# Patient Record
Sex: MALE | Race: WHITE
[De-identification: names, ages, dates, MRNs, and addresses within clinical notes are randomized per-mention and may not be internally consistent; named-entity substitution may affect disease eponyms.]

---

## 2018-07-18 ENCOUNTER — HOSPITAL ENCOUNTER (EMERGENCY)
Dept: HOSPITAL 14 - H.ER | Age: 38
LOS: 1 days | Discharge: HOME | End: 2018-07-19
Payer: SELF-PAY

## 2018-07-18 DIAGNOSIS — R09.89: Primary | ICD-10-CM

## 2018-07-18 NOTE — ED PDOC
HPI: General Adult


Time Seen by Provider: 07/18/18 23:32


Chief Complaint (Nursing): Foreign Body


Chief Complaint (Provider): possible fish bone stuck in throat


History Per: Patient


History/Exam Limitations: no limitations


Additional Complaint(s): 





38 y/o male presents for evaluation of possible fish bone stuck in throat x 2 

days.  Patient states he was eating fish 2 days ago and feels a piece stuck and 

has not moved despite at home remedies.  Reports pain when swallowing. Denies 

fever, nausea/vomiting, chest pain, shortness of breath, palpitations.  





Past Medical History


Reviewed: Historical Data, Nursing Documentation, Vital Signs


Vital Signs: 


 Last Vital Signs











Temp  97.7 F   07/18/18 23:01


 


Pulse  69   07/18/18 23:01


 


Resp  16   07/18/18 23:01


 


BP  115/86   07/18/18 23:01


 


Pulse Ox  98   07/18/18 23:58














- Medical History


PMH: No Chronic Diseases





- Surgical History


Surgical History: No Surg Hx





- Family History


Family History: States: No Known Family Hx





- Living Arrangements


Living Arrangements: With Family





- Home Medications


Home Medications: 


 Ambulatory Orders











 Medication  Instructions  Recorded


 


Ibuprofen [Motrin Tab] 1 tab PO Q6 PRN #20 tab 07/19/18














- Allergies


Allergies/Adverse Reactions: 


 Allergies











Allergy/AdvReac Type Severity Reaction Status Date / Time


 


No Known Allergies Allergy   Verified 07/18/18 23:01














Review of Systems


ROS Statement: Except As Marked, All Systems Reviewed And Found Negative


ENT: Positive for: Throat Pain





Physical Exam





- Reviewed


Nursing Documentation Reviewed: Yes


Vital Signs Reviewed: Yes





- Physical Exam


Appears: Positive for: Well, Non-toxic, No Acute Distress


Head Exam: Positive for: ATRAUMATIC, NORMAL INSPECTION, NORMOCEPHALIC


Skin: Positive for: Normal Color


Eye Exam: Positive for: Normal appearance


ENT: Positive for: Normal ENT Inspection


Cardiovascular/Chest: Positive for: Regular Rate, Rhythm


Respiratory: Positive for: Normal Breath Sounds


Gastrointestinal/Abdominal: Positive for: Normal Exam


Back: Positive for: Normal Inspection


Extremity: Positive for: Normal ROM


Neurologic/Psych: Positive for: Alert, Oriented (x3)





- ECG


O2 Sat by Pulse Oximetry: 98





- Progress


ED Course And Treament: 


CT soft tissue neck





EXAM:


CT Neck Without Intravenous Contrast


EXAM DATE/TIME:


Exam ordered 7/18/2018 11:44 PM


CLINICAL HISTORY:


37 years old, male; Pain; Throat pain; Additional info: R/O fb, possible fish 

bone in throat


TECHNIQUE:


Axial computed tomography images of the neck without intravenous contrast. All 

CT scans at this


facility use at least one of these dose optimization techniques: automated 

exposure control; mA


and/or kV adjustment per patient size (includes targeted exams where dose is 

matched to clinical


indication); or iterative reconstruction.


COMPARISON:


No relevant prior studies available.


FINDINGS:


Oropharynx: Normal. No significant tonsillar enlargement.


Hypopharynx: Normal.


Larynx: Normal. Normal epiglottis.


Trachea: Normal.


Retropharyngeal space: Normal.


Submandibular/parotid glands: Normal. Glands are normal in size.


Thyroid: Normal. No enlarged or calcified nodules.


Bones/joints: No acute fracture.


Soft tissues: Normal.


Vasculature: No acute findings.


Lymph nodes: Normal. No lymphadenopathy.


Lung apices: Unremarkable as visualized.


IMPRESSION:


No CT evidence for ingested foreign body.





Patient educated on findings, discharged with rx ibuprofen


Advised follow up PMD 2-3 days


Return precautions given








Disposition





- Clinical Impression


Clinical Impression: 


 Foreign body sensation in throat








- Patient ED Disposition


Is Patient to be Admitted: No


Counseled Patient/Family Regarding: Studies Performed, Diagnosis, Need For 

Followup, Rx Given





- Disposition


Referrals: 


[facility] [Outside]


Disposition: Routine/Home


Disposition Time: 00:51


Condition: STABLE


Prescriptions: 


Ibuprofen [Motrin Tab] 1 tab PO Q6 PRN #20 tab


 PRN Reason: Pain, Moderate (4-7)

## 2018-07-19 VITALS
OXYGEN SATURATION: 100 % | HEART RATE: 74 BPM | RESPIRATION RATE: 17 BRPM | SYSTOLIC BLOOD PRESSURE: 111 MMHG | DIASTOLIC BLOOD PRESSURE: 63 MMHG | TEMPERATURE: 97.9 F

## 2018-07-19 NOTE — CT
Date of service: 



07/18/2018



PROCEDURE:  CT NECK WITHOUT  CONTRAST



HISTORY:

r/o FB,  possible fish bone in throat



COMPARISON:

None.



TECHNIQUE:

CT of the neck without intravenous contrast. Coronal and sagittal 

reformats generated. 



Radiation dose: .54 mGy-cm



This CT exam was performed using one or more of the following dose 

reduction techniques: Automated exposure control, adjustment of the 

mA and/or kV according to patient size, and/or use of iterative 

reconstruction technique.



FINDINGS:



NASOPHARYNX:

Unremarkable.



SUPRAHYOID NECK:

Unremarkable oropharynx, oral cavity, parapharyngeal space and 

retropharyngeal space. No retained radiodense foreign body is 

appreciated grossly.



INFRAHYOID NECK:

Unremarkable larynx, hypopharynx, and supraglottic space. Vocal cords 

intact. No retained radiodense foreign body is appreciated.



MASS:

No findings suggest definite



GLANDS:

Parotid and submandibular glands unremarkable. Normal size thyroid 

gland, without nodule.



LYMPH NODES:

Normal. No lymphadenopathy. Mass in this noncontrast neck CT. 



CERVICAL SPINE:

No fracture or focal lesion. 



OTHER FINDINGS:

None.



IMPRESSION:

Unremarkable non-contrast enhanced CT of the neck. No retained 

radiodense foreign body appreciated in the pharynx, larynx or in the 

visualized esophagus down to the level of the mid chest.



Concordant preliminary report from [facility], 07/19/2018.

## 2018-11-18 ENCOUNTER — HOSPITAL ENCOUNTER (EMERGENCY)
Dept: HOSPITAL 14 - H.ER | Age: 38
LOS: 1 days | Discharge: HOME | End: 2018-11-19
Payer: SELF-PAY

## 2018-11-18 DIAGNOSIS — R07.89: Primary | ICD-10-CM

## 2018-11-18 DIAGNOSIS — K92.1: ICD-10-CM

## 2018-11-18 LAB
ALBUMIN SERPL-MCNC: 5.1 G/DL (ref 3.5–5)
ALBUMIN/GLOB SERPL: 1.2 {RATIO} (ref 1–2.1)
ALT SERPL-CCNC: 128 U/L (ref 21–72)
AST SERPL-CCNC: 69 U/L (ref 17–59)
BASOPHILS # BLD AUTO: 0 K/UL (ref 0–0.2)
BASOPHILS NFR BLD: 0.3 % (ref 0–2)
BILIRUB UR-MCNC: NEGATIVE MG/DL
BUN SERPL-MCNC: 20 MG/DL (ref 9–20)
CALCIUM SERPL-MCNC: 10 MG/DL (ref 8.4–10.2)
COLOR UR: YELLOW
EOSINOPHIL # BLD AUTO: 0.4 K/UL (ref 0–0.7)
EOSINOPHIL NFR BLD: 4.3 % (ref 0–4)
ERYTHROCYTE [DISTWIDTH] IN BLOOD BY AUTOMATED COUNT: 13.6 % (ref 11.5–14.5)
GFR NON-AFRICAN AMERICAN: > 60
GLUCOSE UR STRIP-MCNC: (no result) MG/DL
HGB BLD-MCNC: 16.5 G/DL (ref 12–18)
LEUKOCYTE ESTERASE UR-ACNC: (no result) LEU/UL
LIPASE SERPL-CCNC: 216 U/L (ref 23–300)
LYMPHOCYTES # BLD AUTO: 2.7 K/UL (ref 1–4.3)
LYMPHOCYTES NFR BLD AUTO: 27.4 % (ref 20–40)
MCH RBC QN AUTO: 29.8 PG (ref 27–31)
MCHC RBC AUTO-ENTMCNC: 33.6 G/DL (ref 33–37)
MCV RBC AUTO: 88.7 FL (ref 80–94)
MONOCYTES # BLD: 0.9 K/UL (ref 0–0.8)
MONOCYTES NFR BLD: 9.4 % (ref 0–10)
NEUTROPHILS # BLD: 5.7 K/UL (ref 1.8–7)
NEUTROPHILS NFR BLD AUTO: 58.6 % (ref 50–75)
NRBC BLD AUTO-RTO: 0.1 % (ref 0–0)
PH UR STRIP: 6 [PH] (ref 5–8)
PLATELET # BLD: 300 K/UL (ref 130–400)
PMV BLD AUTO: 8.2 FL (ref 7.2–11.7)
PROT UR STRIP-MCNC: NEGATIVE MG/DL
RBC # BLD AUTO: 5.53 MIL/UL (ref 4.4–5.9)
RBC # UR STRIP: NEGATIVE /UL
SP GR UR STRIP: 1.02 (ref 1–1.03)
URINE CLARITY: (no result)
UROBILINOGEN UR-MCNC: (no result) MG/DL (ref 0.2–1)
WBC # BLD AUTO: 9.8 K/UL (ref 4.8–10.8)

## 2018-11-18 PROCEDURE — 83690 ASSAY OF LIPASE: CPT

## 2018-11-18 PROCEDURE — 99283 EMERGENCY DEPT VISIT LOW MDM: CPT

## 2018-11-18 PROCEDURE — 74022 RADEX COMPL AQT ABD SERIES: CPT

## 2018-11-18 PROCEDURE — 85025 COMPLETE CBC W/AUTO DIFF WBC: CPT

## 2018-11-18 PROCEDURE — 81003 URINALYSIS AUTO W/O SCOPE: CPT

## 2018-11-18 PROCEDURE — 84484 ASSAY OF TROPONIN QUANT: CPT

## 2018-11-18 PROCEDURE — 96360 HYDRATION IV INFUSION INIT: CPT

## 2018-11-18 PROCEDURE — 80053 COMPREHEN METABOLIC PANEL: CPT

## 2018-11-18 NOTE — ED PDOC
HPI: Chest Pain


Time Seen by Provider: 18 22:18


Chief Complaint (Nursing): Chest Pain


Chief Complaint (Provider): Chest pain


History Per: Patient


History/Exam Limitations: no limitations


Onset/Duration Of Symptoms: Days (chest pain: 2x weeks. Rectal bleedinx day)


Current Symptoms Are (Timing): Still Present


Severity: Moderate


Associated Symptoms: Other (rectal bleeding (2x episodes with bowel movements))


Additional Complaint(s): 





38 year old Mac[place] male with a past medical history of peptic ulcer disease 

presents to the ED with complaints of left sided chest pain that has been 

ongoing for 2x weeks accompanied by rectal bleeding that started today. Patient 

states he had 2x bowel movements today, the first one he saw a small amount of 

blood, subsequently his second bowel movement, had large amounts of bright red 

blood. Patient denies having abdominal pain, but reports having left sided chest

pain which he attributed to his peptic ulcer disease. Patient states that the 

first 2x days he had chest pain, he took Nexium. Patient denies having nausea, 

vomiting, changes in appetite, cough, fevers, shortness of breath. 





PMD: None provided.





Past Medical History


Reviewed: Historical Data, Nursing Documentation, Vital Signs


Vital Signs: 





                                Last Vital Signs











Temp  97.9 F   18 22:00


 


Pulse  89   18 22:00


 


Resp  16   18 22:00


 


BP  122/77   18 22:00


 


Pulse Ox  99   18 22:00














- Medical History


PMH: Gastritis


   Denies: Chronic Kidney Disease


Other PMH: peptic ulcer disease





- Family History


Family History: States: No Known Family Hx





- Social History


Current smoker - smoking cessation education provided: No


Alcohol: None


Drugs: Denies





- Home Medications


Home Medications: 


                                Ambulatory Orders











 Medication  Instructions  Recorded


 


Ibuprofen [Motrin Tab] 1 tab PO Q6 PRN #20 tab 18


 


Esomeprazole Magnesium [Nexium] 20 mg PO QAM #14 ecc 18














- Allergies


Allergies/Adverse Reactions: 


                                    Allergies











Allergy/AdvReac Type Severity Reaction Status Date / Time


 


No Known Allergies Allergy   Verified 18 23:01














Review of Systems


ROS Statement: Except As Marked, All Systems Reviewed And Found Negative


Constitutional: Negative for: Fever, Other (loss of appetite)


Cardiovascular: Positive for: Chest Pain


Respiratory: Negative for: Cough, Shortness of Breath


Gastrointestinal: Positive for: Other (rectal bleeding (2x episodes) with bowel 

movements).  Negative for: Nausea, Vomiting, Abdominal Pain





Physical Exam





- Reviewed


Nursing Documentation Reviewed: Yes


Vital Signs Reviewed: Yes





- Physical Exam


Appears: Positive for: Well, Non-toxic, No Acute Distress


Head Exam: Positive for: ATRAUMATIC, NORMOCEPHALIC


Skin: Positive for: Normal Color


Cardiovascular/Chest: Positive for: Regular Rate, Rhythm


Respiratory: Positive for: Normal Breath Sounds


Gastrointestinal/Abdominal: Positive for: Tenderness (mild left upper quadrant 

tenderness. )


Back: Positive for: Normal Inspection


Extremity: Positive for: Normal ROM


Neurologic/Psych: Positive for: Alert, Oriented (3x)





- Laboratory Results


Result Diagrams: 


                                 18 22:51





                                 18 22:51





- ECG


O2 Sat by Pulse Oximetry: 99 (RA)


Pulse Ox Interpretation: Normal





Medical Decision Making


Medical Decision Makin:18


Initial impression: 38 year old male with chest pain.


Initial plan:


* XRay abdomen with chest


* EKG


* CMP


* drug screen


* lipase


* troponin I


* udip


* CBC


* urinalysis


* occult blood stool


* IV NS 1,000 ml IV ,1000 mls/hr


* protonix injection 40 mg IV


* reevaluation








2335 


Labs reviewed for no clinically significant abnormalities. XR showed no active 

diseases. Patient reports improvement of chest pain and is stable for discharge 

with diagnosis of atypical chest pain and hematochezia. Referral to GI 

specialist will be given to patient.








Scribe Attestation:


Documented byAracelis Gates, acting as a scribe for Bryce Montilla MD.





Provider Scribe Attestation:


All medical record entries made by the Scribe were at my direction and 

personally dictated by me. I have reviewed the chart and agree that the record 

accurately reflects my personal performance of the history, physical exam, 

medical decision making, and the department course for this patient. I have also

 personally directed, reviewed, and agree with the discharge instructions and 

disposition.





Disposition





- Clinical Impression


Clinical Impression: 


 Atypical chest pain, Hematochezia








- Patient ED Disposition


Is Patient to be Admitted: No


Counseled Patient/Family Regarding: Studies Performed, Diagnosis, Need For 

Followup





- Disposition


Referrals: 


[facility] [Outside]


Chuy Harrison MD [Staff Provider] - 


Disposition: Routine/Home


Disposition Time: 23:37


Condition: STABLE


Prescriptions: 


Esomeprazole Magnesium [Nexium] 20 mg PO QAM #14 ecc


Instructions:  Chest Pain That Is Not Caused by the Heart (DC), Bloody Stools


Forms:  [employer] (English)

## 2018-11-19 VITALS
TEMPERATURE: 97.1 F | OXYGEN SATURATION: 100 % | HEART RATE: 81 BPM | DIASTOLIC BLOOD PRESSURE: 68 MMHG | RESPIRATION RATE: 18 BRPM | SYSTOLIC BLOOD PRESSURE: 106 MMHG

## 2018-11-19 NOTE — RAD
Date of service: 



11/18/2018



PROCEDURE:  Radiographs of the chest and abdomen (obstructive series)



HISTORY:

 abd pain 



COMPARISON:

No prior.



TECHNIQUE:

AP radiograph of the chest, with upright and supine radiographs of 

the abdomen.



FINDINGS:



CHEST:

Lungs: Clear.



Cardiovascular: Normal size heart. No pulmonary vascular congestion. 



 No aortic atherosclerotic calcification present



Pleura: No pleural fluid. No pneumothorax.



Other findings: None.



ABDOMEN AND PELVIS:

Bowel: Prominent retained fecal material seen throughout the colon.  

No evidence of mechanical obstruction.



Free air: None.



Bones:  Unremarkable.



Other findings: None.



IMPRESSION:

Unremarkable radiographs of chest.  Potential constipation pattern in 

abdomen radiographs.  No evidence of mechanical bowel obstruction.

## 2019-03-16 ENCOUNTER — HOSPITAL ENCOUNTER (EMERGENCY)
Dept: HOSPITAL 14 - H.ER | Age: 39
Discharge: HOME | End: 2019-03-16
Payer: SELF-PAY

## 2019-03-16 VITALS
RESPIRATION RATE: 16 BRPM | TEMPERATURE: 98.1 F | HEART RATE: 70 BPM | DIASTOLIC BLOOD PRESSURE: 85 MMHG | SYSTOLIC BLOOD PRESSURE: 146 MMHG

## 2019-03-16 VITALS — OXYGEN SATURATION: 95 %

## 2019-03-16 DIAGNOSIS — F43.9: ICD-10-CM

## 2019-03-16 DIAGNOSIS — R07.9: Primary | ICD-10-CM

## 2019-03-16 LAB
ALBUMIN SERPL-MCNC: 4.2 G/DL (ref 3.5–5)
ALBUMIN/GLOB SERPL: 1 {RATIO} (ref 1–2.1)
ALT SERPL-CCNC: 111 U/L (ref 21–72)
AST SERPL-CCNC: 55 U/L (ref 17–59)
BASOPHILS # BLD AUTO: 0 K/UL (ref 0–0.2)
BASOPHILS NFR BLD: 0.6 % (ref 0–2)
BILIRUB UR-MCNC: NEGATIVE MG/DL
BUN SERPL-MCNC: 16 MG/DL (ref 9–20)
CALCIUM SERPL-MCNC: 9.7 MG/DL (ref 8.4–10.2)
COLOR UR: YELLOW
EOSINOPHIL # BLD AUTO: 0.3 K/UL (ref 0–0.7)
EOSINOPHIL NFR BLD: 5 % (ref 0–4)
ERYTHROCYTE [DISTWIDTH] IN BLOOD BY AUTOMATED COUNT: 13.7 % (ref 11.5–14.5)
GFR NON-AFRICAN AMERICAN: > 60
GLUCOSE UR STRIP-MCNC: (no result) MG/DL
HGB BLD-MCNC: 15.4 G/DL (ref 12–18)
LEUKOCYTE ESTERASE UR-ACNC: (no result) LEU/UL
LYMPHOCYTES # BLD AUTO: 2.5 K/UL (ref 1–4.3)
LYMPHOCYTES NFR BLD AUTO: 36.6 % (ref 20–40)
MCH RBC QN AUTO: 29.5 PG (ref 27–31)
MCHC RBC AUTO-ENTMCNC: 33.3 G/DL (ref 33–37)
MCV RBC AUTO: 88.6 FL (ref 80–94)
MONOCYTES # BLD: 0.6 K/UL (ref 0–0.8)
MONOCYTES NFR BLD: 8.1 % (ref 0–10)
NEUTROPHILS # BLD: 3.5 K/UL (ref 1.8–7)
NEUTROPHILS NFR BLD AUTO: 49.7 % (ref 50–75)
NRBC BLD AUTO-RTO: 0.1 % (ref 0–0)
PH UR STRIP: 7 [PH] (ref 5–8)
PLATELET # BLD: 239 K/UL (ref 130–400)
PMV BLD AUTO: 8.3 FL (ref 7.2–11.7)
PROT UR STRIP-MCNC: NEGATIVE MG/DL
RBC # BLD AUTO: 5.22 MIL/UL (ref 4.4–5.9)
RBC # UR STRIP: NEGATIVE /UL
SP GR UR STRIP: 1.02 (ref 1–1.03)
URINE CLARITY: CLEAR
UROBILINOGEN UR-MCNC: (no result) MG/DL (ref 0.2–1)
WBC # BLD AUTO: 6.9 K/UL (ref 4.8–10.8)

## 2019-03-16 NOTE — CT
Date of service: 



03/16/2019



PROCEDURE:  CT HEAD WITHOUT CONTRAST.



HISTORY:

headache



COMPARISON:

None available.



TECHNIQUE:

Axial computed tomography images were obtained through the head/brain 

without intravenous contrast.  



Radiation dose:



Total exam DLP = 810.97 mGy-cm.



This CT exam was performed using one or more of the following dose 

reduction techniques: Automated exposure control, adjustment of the 

mA and/or kV according to patient size, and/or use of iterative 

reconstruction technique.



FINDINGS:



HEMORRHAGE:

No intracranial hemorrhage. 



BRAIN:

No mass effect or edema.  No atrophy or chronic microvascular 

ischemic changes.



VENTRICLES:

Unremarkable. No hydrocephalus. 



CALVARIUM:

Unremarkable.



PARANASAL SINUSES:

Unremarkable as visualized. No significant inflammatory changes.



MASTOID AIR CELLS:

Unremarkable as visualized. No inflammatory changes.



OTHER FINDINGS:

None.



IMPRESSION:

Normal CT of the Head.  This agrees with preliminary report.

## 2019-03-16 NOTE — ED PDOC
HPI: Chest Pain


Time Seen by Provider: 19 01:29


Chief Complaint (Nursing): Chest Pain


Chief Complaint (Provider): Chest Pain


History Per: Patient


History/Exam Limitations: no limitations


Additional Complaint(s): 





39 y/o Martiniqu[place] male with history of gastritis presents to the ED complaining 

of chest pain for 2 days. Patient states pain is left sided and radiates from 

his chest to left side of his neck. Denies any associated nausea, vomiting, or 

diaphoresis. Patient reports that he recently returned from [facility] where he 

went for the  of his father who  from a protracted illness. Patient 

is additionally complaining of left sided headache. 





PMD: None





Past Medical History


Reviewed: Historical Data, Nursing Documentation, Vital Signs


Vital Signs: 





                                Last Vital Signs











Temp  97.7 F   19 01:09


 


Pulse  92 H  19 01:09


 


Resp  18   19 01:09


 


BP  109/68   19 01:09


 


Pulse Ox  95   19 01:09














- Medical History


PMH: Gastritis


   Denies: Chronic Kidney Disease





- Surgical History


Surgical History: No Surg Hx





- Family History


Family History: States: Unknown Family Hx





- Social History


Current smoker - smoking cessation education provided: No


Alcohol: None


Drugs: Denies





- Home Medications


Home Medications: 


                                Ambulatory Orders











 Medication  Instructions  Recorded


 


Ibuprofen [Motrin Tab] 1 tab PO Q6 PRN #20 tab 18


 


Esomeprazole Magnesium [Nexium] 20 mg PO QAM #14 ecc 18














- Allergies


Allergies/Adverse Reactions: 


                                    Allergies











Allergy/AdvReac Type Severity Reaction Status Date / Time


 


No Known Allergies Allergy   Verified 18 23:01














Review of Systems


ROS Statement: Except As Marked, All Systems Reviewed And Found Negative


Constitutional: Negative for: Sweats


Cardiovascular: Positive for: Chest Pain


Gastrointestinal: Negative for: Nausea, Vomiting


Musculoskeletal: Positive for: Neck Pain





Physical Exam





- Reviewed


Nursing Documentation Reviewed: Yes


Vital Signs Reviewed: Yes





- Physical Exam


Appears: Positive for: Well, Non-toxic, No Acute Distress


Head Exam: Positive for: ATRAUMATIC, NORMAL INSPECTION, NORMOCEPHALIC


Skin: Positive for: Normal Color, Warm, DRY


Eye Exam: Positive for: EOMI, Normal appearance, PERRL


ENT: Positive for: Normal ENT Inspection


Neck: Positive for: Normal, Painless ROM


Cardiovascular/Chest: Positive for: Regular Rate, Rhythm.  Negative for: Murmur


Respiratory: Positive for: Normal Breath Sounds.  Negative for: Respiratory 

Distress


Gastrointestinal/Abdominal: Positive for: Normal Exam, Soft.  Negative for: 

Tenderness


Back: Positive for: Normal Inspection


Extremity: Positive for: Normal ROM.  Negative for: Pedal Edema, Deformity


Neurological/Psych: Positive for: Awake, Alert, Normal Tone.  Negative for: 

Motor/Sensory Deficits





- Laboratory Results


Result Diagrams: 


                                 19 02:27





                                 19 02:27


Lab Results: 





                                        











Total Bilirubin  0.4 mg/dl (0.2-1.3)   19  02:27    


 


AST  55 U/L (17-59)   19  02:27    


 


ALT  111 U/L (21-72)  H  19  02:27    


 


Alkaline Phosphatase  53 U/L ()   19  02:27    


 


Total Protein  8.4 G/DL (6.3-8.2)  H  19  02:27    


 


Albumin  4.2 g/dL (3.5-5.0)   19  02:27    


 


Globulin  4.2 gm/dL (2.2-3.9)  H  19  02:27    


 


Albumin/Globulin Ratio  1.0  (1.0-2.1)   19  02:27    








                                        











Urine Color  Yellow  (YELLOW)   19  02:05    


 


Urine Clarity  Clear  (Clear)   19  02:05    


 


Urine pH  7.0  (5.0-8.0)   19  02:05    


 


Ur Specific Gravity  1.016  (1.003-1.030)   19  02:05    


 


Urine Protein  Negative mg/dL (NEGATIVE)   19  02:05    


 


Urine Glucose (UA)  Neg mg/dL (NEGATIVE)   19  02:05    


 


Urine Ketones  Negative mg/dL (NEGATIVE)   19  02:05    


 


Urine Blood  Negative  (NEGATIVE)   19  02:05    


 


Urine Nitrate  Negative  (NEGATIVE)   19  02:05    


 


Urine Bilirubin  Negative  (NEGATIVE)   19  02:05    


 


Urine Urobilinogen  0.2-1.0 mg/dL (0.2-1.0)   19  02:05    


 


Ur Leukocyte Esterase  Neg Chauncey/uL (Negative)   19  02:05    


 


Urine RBC (Auto)  < 1 /hpf (0-3)   19  02:05    


 


Urine Microscopic WBC  < 1 /hpf (0-5)   19  02:05    














- ECG


O2 Sat by Pulse Oximetry: 95 (RA)


Pulse Ox Interpretation: Normal





Medical Decision Making


Medical Decision Making: 





Time:01:59


Initial Impression:39 y/o with chest pain


Initial Plan:


* Labs


* EKG


* CT Head





02:49


CT Head


Normal size of the ventricles and extra-axial spaces for the patient's age.  

Normal white matter tracts of the supratentorial brain.  Normal basal ganglia 

and thalami.  Normal brainstem.  Normal cerebellum.        





There is no demonstrated extra-axial, intraparenchymal, or intraventricular 

hemorrhage.





There are no findings of an acute ischemic infarction.  





Normal calvarium.  There is no demonstrated fracture.





Normal soft tissue structures. 





Normal visualized paranasal sinuses.





IMPRESSION:


Normal unenhanced CT scan of the brain.





04:00


Labs reviewed no clinically significant abnormalities. Patient is stable for 

discharge. Diagnosis is atypical chest pain, stress.  


-----------------------------------------------------------------------

--------------------------


Scribe Attestation:


Documented by Rashad Lim, acting as a scribe forDr. Bryce Montilla MD








Provider Scribe Attestation:


All medical record entries made by the Scribe were at my direction and 

personally dictated by me. I have reviewed the chart and agree that the record 

accurately reflects my personal performance of the history, physical exam, 

medical decision making, and the department course for this patient. I have also

 personally directed, reviewed, and agree with the discharge instructions and 

disposition.





Disposition





- Clinical Impression


Clinical Impression: 


 Atypical chest pain, Stress








- Patient ED Disposition


Is Patient to be Admitted: No





- Disposition


Disposition: Routine/Home


Disposition Time: 04:00


Condition: IMPROVED


Additional Instructions: 





NUHA CERON, thank you for letting us take care of you today. Your provider was 

Bryce Montilla MD and you were treated for CHEST PAIN. The emergency 

medical care you received today was directed at your acute symptoms. If you were

 prescribed any medication, please fill it and take as directed. It may take 

several days for your symptoms to resolve. Return to the Emergency Department if

 your symptoms worsen, do not improve, or if you have any other problems.





Please contact your doctor or call one of the physicians/clinics you have been 

referred to that are listed on the Patient Visit Information form that is 

included in your discharge packet. Bring any paperwork you were given at 

discharge with you along with any medications you are taking to your follow up 

visit. Our treatment cannot replace ongoing medical care by a primary care 

provider outside of the emergency department.





Thank you for allowing the [facility] team to be part of your care today.








If you had an X-Ray or CT scan: A Radiologist will review the ED reading if any 

change in treatment is needed we will contact you.***





If you had a blood, urine, or wound culture: It will take several days for the 

results, if any change in treatment is needed we will contact you.***





If you had an STI test: It will take 48 hours for the results. Please call after

 1 week if you have not heard back.***


Instructions:  Chest Pain That Is Not Caused by the Heart (DC), Stress


Forms:  [employer] (English)

## 2019-03-17 NOTE — CARD
--------------- APPROVED REPORT --------------





Date of service: 03/16/2019



EKG Measurement

Heart [id number]

ME [id number]

[id number]

[id number]

[id number]



<Conclusion>

Normal sinus rhythm

Normal ECG